# Patient Record
Sex: MALE | Race: WHITE | Employment: FULL TIME | URBAN - METROPOLITAN AREA
[De-identification: names, ages, dates, MRNs, and addresses within clinical notes are randomized per-mention and may not be internally consistent; named-entity substitution may affect disease eponyms.]

---

## 2024-08-11 ENCOUNTER — HOSPITAL ENCOUNTER (EMERGENCY)
Age: 39
Discharge: HOME | End: 2024-08-11
Attending: STUDENT IN AN ORGANIZED HEALTH CARE EDUCATION/TRAINING PROGRAM
Payer: COMMERCIAL

## 2024-08-11 ENCOUNTER — APPOINTMENT (OUTPATIENT)
Dept: GENERAL RADIOLOGY | Age: 39
End: 2024-08-11
Attending: STUDENT IN AN ORGANIZED HEALTH CARE EDUCATION/TRAINING PROGRAM
Payer: COMMERCIAL

## 2024-08-11 VITALS
DIASTOLIC BLOOD PRESSURE: 99 MMHG | TEMPERATURE: 97.9 F | SYSTOLIC BLOOD PRESSURE: 144 MMHG | OXYGEN SATURATION: 100 % | HEART RATE: 74 BPM | RESPIRATION RATE: 16 BRPM | WEIGHT: 196.21 LBS

## 2024-08-11 DIAGNOSIS — S62.320A CLOSED DISPLACED FRACTURE OF SHAFT OF SECOND METACARPAL BONE OF RIGHT HAND, INITIAL ENCOUNTER: Primary | ICD-10-CM

## 2024-08-11 DIAGNOSIS — S62.322A CLOSED DISPLACED FRACTURE OF SHAFT OF THIRD METACARPAL BONE OF RIGHT HAND, INITIAL ENCOUNTER: ICD-10-CM

## 2024-08-11 PROCEDURE — 99283 EMERGENCY DEPT VISIT LOW MDM: CPT

## 2024-08-11 PROCEDURE — 29125 APPL SHORT ARM SPLINT STATIC: CPT

## 2024-08-11 PROCEDURE — 73130 X-RAY EXAM OF HAND: CPT

## 2024-08-11 PROCEDURE — 6370000000 HC RX 637 (ALT 250 FOR IP): Performed by: STUDENT IN AN ORGANIZED HEALTH CARE EDUCATION/TRAINING PROGRAM

## 2024-08-11 RX ORDER — IBUPROFEN 800 MG/1
800 TABLET ORAL
Qty: 21 TABLET | Refills: 0 | Status: SHIPPED | OUTPATIENT
Start: 2024-08-11 | End: 2024-08-18

## 2024-08-11 RX ORDER — IBUPROFEN 800 MG/1
800 TABLET ORAL ONCE
Status: COMPLETED | OUTPATIENT
Start: 2024-08-11 | End: 2024-08-11

## 2024-08-11 RX ORDER — OXYCODONE HYDROCHLORIDE 5 MG/1
10 TABLET ORAL ONCE
Status: COMPLETED | OUTPATIENT
Start: 2024-08-11 | End: 2024-08-11

## 2024-08-11 RX ORDER — OXYCODONE HYDROCHLORIDE 5 MG/1
5 TABLET ORAL EVERY 6 HOURS PRN
Qty: 12 TABLET | Refills: 0 | Status: SHIPPED | OUTPATIENT
Start: 2024-08-11 | End: 2024-08-14

## 2024-08-11 RX ADMIN — OXYCODONE HYDROCHLORIDE 10 MG: 5 TABLET ORAL at 10:30

## 2024-08-11 RX ADMIN — IBUPROFEN 800 MG: 800 TABLET, FILM COATED ORAL at 10:30

## 2024-08-11 ASSESSMENT — PAIN SCALES - GENERAL: PAINLEVEL_OUTOF10: 4

## 2024-08-11 NOTE — ED PROVIDER NOTES
[facility]     EMERGENCY DEPARTMENT ENCOUNTER            Pt Name: Nash Madrigal   MRN: [id number]   Birthdate 1985   Date of evaluation: 8/11/2024   Provider: Marco Antonio Parks MD   PCP: Jayson Kelly MD   Note Started: 11:04 AM EDT 8/11/24          CHIEF COMPLAINT     Chief Complaint   Patient presents with    Hand Injury     Hit with a softball yesterday, R hand              HISTORY OF PRESENT ILLNESS:   History from : Patient   Limitations to history : None     Nash Madrigal is a 39 y.o. male who presents after being hit in the hand by a another player on a softball team.  Patient was running directly towards him.  He has had significant pain in his dominant right hand.  He has a previous fracture of the  index finger.  He has had surgery there.  Pain is directly below this and a significant swelling.  This happened yesterday.  Try to take ibuprofen and Tylenol for pain did not work.  Patient was worried when the swelling returned.  He has pain with any movement but he can move all fingers.  Has sensation.  Swelling is gotten worse since yesterday and now has a bruise on his palm.  No other injury    Nursing Notes were all reviewed and agreed with, or any disagreements were addressed in the HPI.     REVIEW OF SYSTEMS :    Positives and Pertinent negatives as per HPI.      MEDICAL HISTORY   has no past medical history on file.    History reviewed. No pertinent surgical history.   CURRENTMEDICATIONS       Discharge Medication List as of 8/11/2024 11:28 AM        CONTINUE these medications which have NOT CHANGED    Details   cephALEXin (KEFLEX) 500 MG capsule Take 1 capsule by mouth 4 times daily., Disp-40 capsule, R-0            SCREENINGS          Stephanie Coma Scale  Eye Opening: Spontaneous  Best Verbal Response: Oriented  Best Motor Response: Obeys commands  Stephanie Coma Scale Score: 15                CIWA Assessment  BP: (!) 144/99  Pulse: 74

## 2024-08-11 NOTE — ED NOTES
Patient DCed from ED at this time. Discussed AVS, follow up, and scripts. They verbalized understanding. Reinforced that should symptoms persist or worsen to return to the ED. They verbalized understanding. Patient ambulated out of ED. RN thanked patient for choosing [facility].

## 2024-08-14 ENCOUNTER — PREP FOR PROCEDURE (OUTPATIENT)
Dept: ORTHOPEDIC SURGERY | Age: 39
End: 2024-08-14

## 2024-08-14 ENCOUNTER — OFFICE VISIT (OUTPATIENT)
Dept: ORTHOPEDIC SURGERY | Age: 39
End: 2024-08-14

## 2024-08-14 ENCOUNTER — ANESTHESIA EVENT (OUTPATIENT)
Dept: OPERATING ROOM | Age: 39
End: 2024-08-14
Payer: COMMERCIAL

## 2024-08-14 VITALS — BODY MASS INDEX: 27.44 KG/M2 | WEIGHT: 196 LBS | HEIGHT: 71 IN | RESPIRATION RATE: 16 BRPM

## 2024-08-14 DIAGNOSIS — S62.322A CLOSED DISPLACED FRACTURE OF SHAFT OF THIRD METACARPAL BONE OF RIGHT HAND, INITIAL ENCOUNTER: ICD-10-CM

## 2024-08-14 DIAGNOSIS — S62.320A CLOSED DISPLACED FRACTURE OF SHAFT OF SECOND METACARPAL BONE OF RIGHT HAND, INITIAL ENCOUNTER: Primary | ICD-10-CM

## 2024-08-14 DIAGNOSIS — F17.200 CURRENT SMOKER: ICD-10-CM

## 2024-08-14 PROBLEM — S62.302A: Status: ACTIVE | Noted: 2024-08-14

## 2024-08-14 PROBLEM — S62.300A FRACTURE OF SECOND METACARPAL BONE OF RIGHT HAND: Status: ACTIVE | Noted: 2024-08-14

## 2024-08-14 RX ORDER — HYDROCODONE BITARTRATE AND ACETAMINOPHEN 5; 325 MG/1; MG/1
1 TABLET ORAL EVERY 6 HOURS PRN
Qty: 20 TABLET | Refills: 0 | Status: SHIPPED | OUTPATIENT
Start: 2024-08-14 | End: 2024-08-19

## 2024-08-14 SDOH — HEALTH STABILITY: PHYSICAL HEALTH: ON AVERAGE, HOW MANY MINUTES DO YOU ENGAGE IN EXERCISE AT THIS LEVEL?: 20 MIN

## 2024-08-14 SDOH — HEALTH STABILITY: PHYSICAL HEALTH: ON AVERAGE, HOW MANY DAYS PER WEEK DO YOU ENGAGE IN MODERATE TO STRENUOUS EXERCISE (LIKE A BRISK WALK)?: 4 DAYS

## 2024-08-14 NOTE — PROGRESS NOTES
CHIEF COMPLAINT: Right hand pain/ 2nd and 3rd  shaft fracture.    DATE OF INJURY: 8/10/2024    HISTORY:  Mr. Madrigal 39 y.o.  male right handed presents today for the first visit for evaluation of a right hand injury which occurred when he playing in a [employer] ball game and another player hit his hand. He is complaining of ulnar hand pain and swelling. Rates his pain a 4/10 VAS. This is better with elevation and worse with ROM. The pain is sharp and not radiating. No other complaint. He was seen at [facility], where he was evaluated and splinted and asked to see orthopedics. He is a smoker. He works a desk job.     No past medical history on file.    No past surgical history on file.    Social History     Socioeconomic History    Marital status: Single     Spouse name: Not on file    Number of children: Not on file    Years of education: Not on file    Highest education level: Not on file   Occupational History    Not on file   Tobacco Use    Smoking status: Some Days     Current packs/day: 1.00     Types: Cigarettes    Smokeless tobacco: Not on file   Substance and Sexual Activity    Alcohol use: No    Drug use: Never    Sexual activity: Not on file   Other Topics Concern    Not on file   Social History Narrative    Not on file     Social Determinants of Health     Financial Resource Strain: Not on file   Food Insecurity: Unknown (1/18/2024)    Received from [facility]     Food Insecurities     Worried about running out of food: Not on file     Food Bought: Not on file   Transportation Needs: Unknown (1/18/2024)    Received from [facility]     Transportation     Worried about transportation: Not on file   Physical Activity: Insufficiently Active (8/14/2024)    Exercise Vital Sign     Days of Exercise per Week: 4 days     Minutes of Exercise per Session: 20 min   Stress: Not on file   Social Connections: Not on file   Intimate Partner Violence: Unknown (1/18/2024)    Received from

## 2024-08-19 ENCOUNTER — ANESTHESIA (OUTPATIENT)
Dept: OPERATING ROOM | Age: 39
End: 2024-08-19
Payer: COMMERCIAL

## 2024-08-19 ENCOUNTER — APPOINTMENT (OUTPATIENT)
Dept: GENERAL RADIOLOGY | Age: 39
End: 2024-08-19
Attending: ORTHOPAEDIC SURGERY
Payer: COMMERCIAL

## 2024-08-19 ENCOUNTER — HOSPITAL ENCOUNTER (OUTPATIENT)
Age: 39
Setting detail: OUTPATIENT SURGERY
Discharge: HOME | End: 2024-08-19
Attending: ORTHOPAEDIC SURGERY | Admitting: ORTHOPAEDIC SURGERY
Payer: COMMERCIAL

## 2024-08-19 VITALS
BODY MASS INDEX: 26.5 KG/M2 | RESPIRATION RATE: 16 BRPM | WEIGHT: 195.66 LBS | HEART RATE: 83 BPM | TEMPERATURE: 97.4 F | SYSTOLIC BLOOD PRESSURE: 152 MMHG | OXYGEN SATURATION: 98 % | HEIGHT: 72 IN | DIASTOLIC BLOOD PRESSURE: 95 MMHG

## 2024-08-19 PROCEDURE — 73130 X-RAY EXAM OF HAND: CPT

## 2024-08-19 PROCEDURE — 2720000010 HC SURG SUPPLY STERILE: Performed by: ORTHOPAEDIC SURGERY

## 2024-08-19 PROCEDURE — 6370000000 HC RX 637 (ALT 250 FOR IP): Performed by: ANESTHESIOLOGY

## 2024-08-19 PROCEDURE — 6360000002 HC RX W HCPCS: Performed by: ORTHOPAEDIC SURGERY

## 2024-08-19 PROCEDURE — 2500000003 HC RX 250 WO HCPCS: Performed by: NURSE ANESTHETIST, CERTIFIED REGISTERED

## 2024-08-19 PROCEDURE — 3600000004 HC SURGERY LEVEL 4 BASE: Performed by: ORTHOPAEDIC SURGERY

## 2024-08-19 PROCEDURE — 3700000001 HC ADD 15 MINUTES (ANESTHESIA): Performed by: ORTHOPAEDIC SURGERY

## 2024-08-19 PROCEDURE — 7100000001 HC PACU RECOVERY - ADDTL 15 MIN: Performed by: ORTHOPAEDIC SURGERY

## 2024-08-19 PROCEDURE — 2580000003 HC RX 258: Performed by: STUDENT IN AN ORGANIZED HEALTH CARE EDUCATION/TRAINING PROGRAM

## 2024-08-19 PROCEDURE — 7100000011 HC PHASE II RECOVERY - ADDTL 15 MIN: Performed by: ORTHOPAEDIC SURGERY

## 2024-08-19 PROCEDURE — 3700000000 HC ANESTHESIA ATTENDED CARE: Performed by: ORTHOPAEDIC SURGERY

## 2024-08-19 PROCEDURE — A4217 STERILE WATER/SALINE, 500 ML: HCPCS | Performed by: ORTHOPAEDIC SURGERY

## 2024-08-19 PROCEDURE — 2580000003 HC RX 258: Performed by: ORTHOPAEDIC SURGERY

## 2024-08-19 PROCEDURE — 6360000002 HC RX W HCPCS: Performed by: NURSE ANESTHETIST, CERTIFIED REGISTERED

## 2024-08-19 PROCEDURE — C1713 ANCHOR/SCREW BN/BN,TIS/BN: HCPCS | Performed by: ORTHOPAEDIC SURGERY

## 2024-08-19 PROCEDURE — 3600000014 HC SURGERY LEVEL 4 ADDTL 15MIN: Performed by: ORTHOPAEDIC SURGERY

## 2024-08-19 PROCEDURE — 7100000010 HC PHASE II RECOVERY - FIRST 15 MIN: Performed by: ORTHOPAEDIC SURGERY

## 2024-08-19 PROCEDURE — 6360000002 HC RX W HCPCS: Performed by: ANESTHESIOLOGY

## 2024-08-19 PROCEDURE — 7100000000 HC PACU RECOVERY - FIRST 15 MIN: Performed by: ORTHOPAEDIC SURGERY

## 2024-08-19 PROCEDURE — 2709999900 HC NON-CHARGEABLE SUPPLY: Performed by: ORTHOPAEDIC SURGERY

## 2024-08-19 DEVICE — BONE SCREW, T5
Type: IMPLANTABLE DEVICE | Site: HAND | Status: FUNCTIONAL
Brand: VARIAX

## 2024-08-19 RX ORDER — NALOXONE HYDROCHLORIDE 0.4 MG/ML
INJECTION, SOLUTION INTRAMUSCULAR; INTRAVENOUS; SUBCUTANEOUS PRN
Status: DISCONTINUED | OUTPATIENT
Start: 2024-08-19 | End: 2024-08-19 | Stop reason: HOSPADM

## 2024-08-19 RX ORDER — SODIUM CHLORIDE 0.9 % (FLUSH) 0.9 %
5-40 SYRINGE (ML) INJECTION PRN
Status: DISCONTINUED | OUTPATIENT
Start: 2024-08-19 | End: 2024-08-19 | Stop reason: HOSPADM

## 2024-08-19 RX ORDER — ONDANSETRON 2 MG/ML
4 INJECTION INTRAMUSCULAR; INTRAVENOUS
Status: COMPLETED | OUTPATIENT
Start: 2024-08-19 | End: 2024-08-19

## 2024-08-19 RX ORDER — BUPIVACAINE HYDROCHLORIDE 5 MG/ML
INJECTION, SOLUTION EPIDURAL; INTRACAUDAL
Status: COMPLETED | OUTPATIENT
Start: 2024-08-19 | End: 2024-08-19

## 2024-08-19 RX ORDER — ONDANSETRON 2 MG/ML
INJECTION INTRAMUSCULAR; INTRAVENOUS PRN
Status: DISCONTINUED | OUTPATIENT
Start: 2024-08-19 | End: 2024-08-19 | Stop reason: SDUPTHER

## 2024-08-19 RX ORDER — SODIUM CHLORIDE 9 MG/ML
INJECTION, SOLUTION INTRAVENOUS PRN
Status: DISCONTINUED | OUTPATIENT
Start: 2024-08-19 | End: 2024-08-19 | Stop reason: HOSPADM

## 2024-08-19 RX ORDER — PROPOFOL 10 MG/ML
INJECTION, EMULSION INTRAVENOUS PRN
Status: DISCONTINUED | OUTPATIENT
Start: 2024-08-19 | End: 2024-08-19 | Stop reason: SDUPTHER

## 2024-08-19 RX ORDER — OXYCODONE HYDROCHLORIDE 5 MG/1
5 TABLET ORAL PRN
Status: COMPLETED | OUTPATIENT
Start: 2024-08-19 | End: 2024-08-19

## 2024-08-19 RX ORDER — MIDAZOLAM HYDROCHLORIDE 1 MG/ML
INJECTION INTRAMUSCULAR; INTRAVENOUS PRN
Status: DISCONTINUED | OUTPATIENT
Start: 2024-08-19 | End: 2024-08-19 | Stop reason: SDUPTHER

## 2024-08-19 RX ORDER — LIDOCAINE HYDROCHLORIDE 20 MG/ML
INJECTION, SOLUTION EPIDURAL; INFILTRATION; INTRACAUDAL; PERINEURAL PRN
Status: DISCONTINUED | OUTPATIENT
Start: 2024-08-19 | End: 2024-08-19 | Stop reason: SDUPTHER

## 2024-08-19 RX ORDER — HYDRALAZINE HYDROCHLORIDE 20 MG/ML
5 INJECTION INTRAMUSCULAR; INTRAVENOUS ONCE
Status: COMPLETED | OUTPATIENT
Start: 2024-08-19 | End: 2024-08-19

## 2024-08-19 RX ORDER — KETOROLAC TROMETHAMINE 30 MG/ML
INJECTION, SOLUTION INTRAMUSCULAR; INTRAVENOUS PRN
Status: DISCONTINUED | OUTPATIENT
Start: 2024-08-19 | End: 2024-08-19 | Stop reason: SDUPTHER

## 2024-08-19 RX ORDER — SODIUM CHLORIDE 0.9 % (FLUSH) 0.9 %
5-40 SYRINGE (ML) INJECTION EVERY 12 HOURS SCHEDULED
Status: DISCONTINUED | OUTPATIENT
Start: 2024-08-19 | End: 2024-08-19 | Stop reason: HOSPADM

## 2024-08-19 RX ORDER — OXYCODONE HYDROCHLORIDE 10 MG/1
10 TABLET ORAL PRN
Status: COMPLETED | OUTPATIENT
Start: 2024-08-19 | End: 2024-08-19

## 2024-08-19 RX ORDER — FENTANYL CITRATE 50 UG/ML
INJECTION, SOLUTION INTRAMUSCULAR; INTRAVENOUS PRN
Status: DISCONTINUED | OUTPATIENT
Start: 2024-08-19 | End: 2024-08-19 | Stop reason: SDUPTHER

## 2024-08-19 RX ORDER — DEXAMETHASONE SODIUM PHOSPHATE 4 MG/ML
INJECTION, SOLUTION INTRA-ARTICULAR; INTRALESIONAL; INTRAMUSCULAR; INTRAVENOUS; SOFT TISSUE PRN
Status: DISCONTINUED | OUTPATIENT
Start: 2024-08-19 | End: 2024-08-19 | Stop reason: SDUPTHER

## 2024-08-19 RX ORDER — MAGNESIUM HYDROXIDE 1200 MG/15ML
LIQUID ORAL CONTINUOUS PRN
Status: DISCONTINUED | OUTPATIENT
Start: 2024-08-19 | End: 2024-08-19 | Stop reason: HOSPADM

## 2024-08-19 RX ORDER — CEPHALEXIN 500 MG/1
500 CAPSULE ORAL 4 TIMES DAILY
Qty: 20 CAPSULE | Refills: 0 | Status: SHIPPED | OUTPATIENT
Start: 2024-08-19 | End: 2024-08-24

## 2024-08-19 RX ORDER — MEPERIDINE HYDROCHLORIDE 25 MG/ML
12.5 INJECTION INTRAMUSCULAR; INTRAVENOUS; SUBCUTANEOUS ONCE
Status: COMPLETED | OUTPATIENT
Start: 2024-08-19 | End: 2024-08-19

## 2024-08-19 RX ADMIN — PROPOFOL 200 MG: 10 INJECTION, EMULSION INTRAVENOUS at 08:37

## 2024-08-19 RX ADMIN — HYDROMORPHONE HYDROCHLORIDE 1 MG: 1 INJECTION, SOLUTION INTRAMUSCULAR; INTRAVENOUS; SUBCUTANEOUS at 08:55

## 2024-08-19 RX ADMIN — DEXAMETHASONE SODIUM PHOSPHATE 8 MG: 4 INJECTION, SOLUTION INTRAMUSCULAR; INTRAVENOUS at 08:44

## 2024-08-19 RX ADMIN — SODIUM CHLORIDE 2000 MG: 900 INJECTION INTRAVENOUS at 08:42

## 2024-08-19 RX ADMIN — HYDROMORPHONE HYDROCHLORIDE 0.25 MG: 1 INJECTION, SOLUTION INTRAMUSCULAR; INTRAVENOUS; SUBCUTANEOUS at 10:04

## 2024-08-19 RX ADMIN — MEPERIDINE HYDROCHLORIDE 12.5 MG: 25 INJECTION INTRAMUSCULAR; INTRAVENOUS; SUBCUTANEOUS at 10:15

## 2024-08-19 RX ADMIN — FENTANYL CITRATE 50 MCG: 50 INJECTION INTRAMUSCULAR; INTRAVENOUS at 09:31

## 2024-08-19 RX ADMIN — ONDANSETRON 4 MG: 2 INJECTION INTRAMUSCULAR; INTRAVENOUS at 08:44

## 2024-08-19 RX ADMIN — MIDAZOLAM 2 MG: 1 INJECTION INTRAMUSCULAR; INTRAVENOUS at 08:34

## 2024-08-19 RX ADMIN — HYDROMORPHONE HYDROCHLORIDE 0.25 MG: 1 INJECTION, SOLUTION INTRAMUSCULAR; INTRAVENOUS; SUBCUTANEOUS at 10:11

## 2024-08-19 RX ADMIN — HYDRALAZINE HYDROCHLORIDE 5 MG: 20 INJECTION, SOLUTION INTRAMUSCULAR; INTRAVENOUS at 10:14

## 2024-08-19 RX ADMIN — KETOROLAC TROMETHAMINE 30 MG: 30 INJECTION, SOLUTION INTRAMUSCULAR at 09:43

## 2024-08-19 RX ADMIN — HYDROMORPHONE HYDROCHLORIDE 0.5 MG: 1 INJECTION, SOLUTION INTRAMUSCULAR; INTRAVENOUS; SUBCUTANEOUS at 10:23

## 2024-08-19 RX ADMIN — LIDOCAINE HYDROCHLORIDE 100 MG: 20 INJECTION, SOLUTION EPIDURAL; INFILTRATION; INTRACAUDAL; PERINEURAL at 08:37

## 2024-08-19 RX ADMIN — SODIUM CHLORIDE: 9 INJECTION, SOLUTION INTRAVENOUS at 07:25

## 2024-08-19 RX ADMIN — OXYCODONE 5 MG: 5 TABLET ORAL at 11:10

## 2024-08-19 RX ADMIN — PROPOFOL 25 MG: 10 INJECTION, EMULSION INTRAVENOUS at 08:56

## 2024-08-19 RX ADMIN — FENTANYL CITRATE 50 MCG: 50 INJECTION INTRAMUSCULAR; INTRAVENOUS at 08:36

## 2024-08-19 RX ADMIN — SODIUM CHLORIDE: 9 INJECTION, SOLUTION INTRAVENOUS at 09:34

## 2024-08-19 RX ADMIN — ONDANSETRON 4 MG: 2 INJECTION INTRAMUSCULAR; INTRAVENOUS at 10:32

## 2024-08-19 ASSESSMENT — PAIN SCALES - GENERAL
PAINLEVEL_OUTOF10: 5
PAINLEVEL_OUTOF10: 4
PAINLEVEL_OUTOF10: 5
PAINLEVEL_OUTOF10: 4
PAINLEVEL_OUTOF10: 0
PAINLEVEL_OUTOF10: 6
PAINLEVEL_OUTOF10: 7
PAINLEVEL_OUTOF10: 0
PAINLEVEL_OUTOF10: 5
PAINLEVEL_OUTOF10: 3
PAINLEVEL_OUTOF10: 5

## 2024-08-19 ASSESSMENT — PAIN DESCRIPTION - PAIN TYPE
TYPE: SURGICAL PAIN

## 2024-08-19 ASSESSMENT — PAIN DESCRIPTION - DESCRIPTORS
DESCRIPTORS: ACHING
DESCRIPTORS: ACHING;DISCOMFORT
DESCRIPTORS: DISCOMFORT
DESCRIPTORS: ACHING
DESCRIPTORS: ACHING
DESCRIPTORS: DISCOMFORT
DESCRIPTORS: THROBBING
DESCRIPTORS: ACHING
DESCRIPTORS: DISCOMFORT

## 2024-08-19 ASSESSMENT — PAIN DESCRIPTION - FREQUENCY
FREQUENCY: CONTINUOUS

## 2024-08-19 ASSESSMENT — PAIN - FUNCTIONAL ASSESSMENT
PAIN_FUNCTIONAL_ASSESSMENT: PREVENTS OR INTERFERES SOME ACTIVE ACTIVITIES AND ADLS
PAIN_FUNCTIONAL_ASSESSMENT: 0-10
PAIN_FUNCTIONAL_ASSESSMENT: PREVENTS OR INTERFERES SOME ACTIVE ACTIVITIES AND ADLS
PAIN_FUNCTIONAL_ASSESSMENT: PREVENTS OR INTERFERES SOME ACTIVE ACTIVITIES AND ADLS
PAIN_FUNCTIONAL_ASSESSMENT: ACTIVITIES ARE NOT PREVENTED
PAIN_FUNCTIONAL_ASSESSMENT: PREVENTS OR INTERFERES SOME ACTIVE ACTIVITIES AND ADLS
PAIN_FUNCTIONAL_ASSESSMENT: 0-10

## 2024-08-19 ASSESSMENT — PAIN DESCRIPTION - LOCATION
LOCATION: HAND

## 2024-08-19 ASSESSMENT — ENCOUNTER SYMPTOMS: SHORTNESS OF BREATH: 0

## 2024-08-19 ASSESSMENT — PAIN DESCRIPTION - ORIENTATION
ORIENTATION: RIGHT

## 2024-08-19 ASSESSMENT — PAIN DESCRIPTION - ONSET
ONSET: ON-GOING
ONSET: ON-GOING
ONSET: AWAKENED FROM SLEEP
ONSET: ON-GOING

## 2024-08-19 NOTE — ANESTHESIA PRE PROCEDURE
Department of Anesthesiology  Preprocedure Note       Name:  Nash Madrigal   Age:  39 y.o.  :  1985                                          MRN:  [id number]         Date:  2024      Surgeon: Surgeon(s):  Deepa Marie MD    Procedure: Procedure(s):  RIGHT SECOND AND THIRD FINGER OPEN REDUCTION INTERNAL FIXATION    Medications prior to admission:   Prior to Admission medications    Medication Sig Start Date End Date Taking? Authorizing Provider   cephALEXin (KEFLEX) 500 MG capsule Take 1 capsule by mouth 4 times daily for 5 days 24 Yes Deepa Marie MD   HYDROcodone-acetaminophen (NORCO) 5-325 MG per tablet Take 1 tablet by mouth every 6 hours as needed for Pain for up to 5 days. Max Daily Amount: 4 tablets 24 Yes Deepa Marie MD   ibuprofen (ADVIL;MOTRIN) 800 MG tablet Take 1 tablet by mouth 3 times daily (with meals) for 7 days 24  Marco Antonio Parks MD   cephALEXin (KEFLEX) 500 MG capsule Take 1 capsule by mouth 4 times daily. 13   Benji Wyatt DO       Current medications:    Current Facility-Administered Medications   Medication Dose Route Frequency Provider Last Rate Last Admin   • sodium chloride flush 0.9 % injection 5-40 mL  5-40 mL IntraVENous 2 times per day Toñito Haynes MD       • sodium chloride flush 0.9 % injection 5-40 mL  5-40 mL IntraVENous PRN Toñito Haynes MD       • 0.9 % sodium chloride infusion   IntraVENous PRN Toñito Haynes  mL/hr at 24 0725 New Bag at 24 0725   • ceFAZolin (ANCEF) 2,000 mg in sodium chloride 0.9 % 50 mL IVPB (mini-bag)  2,000 mg IntraVENous On Call to OR Deepa Marie MD           Allergies:  No Known Allergies    Problem List:    Patient Active Problem List   Diagnosis Code   • Closed fracture of third metacarpal bone of right hand S62.302A   • Fracture of second metacarpal bone of right hand S62.300A       Past Medical History:        Diagnosis Date   • Wears contact lenses

## 2024-08-19 NOTE — H&P
Preoperative H&P Update    The patient's History and Physical in the medical record from 8/14/2024 was reviewed by me today.    Past Medical History:   Diagnosis Date    Wears contact lenses     Wears glasses      Past Surgical History:   Procedure Laterality Date    FINGER FRACTURE SURGERY Right 2013    TONSILLECTOMY      WISDOM TOOTH EXTRACTION       No current facility-administered medications on file prior to encounter.     Current Outpatient Medications on File Prior to Encounter   Medication Sig Dispense Refill    HYDROcodone-acetaminophen (NORCO) 5-325 MG per tablet Take 1 tablet by mouth every 6 hours as needed for Pain for up to 5 days. Max Daily Amount: 4 tablets 20 tablet 0    ibuprofen (ADVIL;MOTRIN) 800 MG tablet Take 1 tablet by mouth 3 times daily (with meals) for 7 days 21 tablet 0    cephALEXin (KEFLEX) 500 MG capsule Take 1 capsule by mouth 4 times daily. 40 capsule 0       No Known Allergies   I reviewed the HPI, medications, allergies, reason for surgery, diagnosis and treatment plan and there has been no change.    The patient was evaluated by me today. Physical exam findings for this update include:    Vitals:    08/19/24 0714   BP: 130/88   Pulse: 73   Resp: 16   Temp: 97.2 °F (36.2 °C)   SpO2: 98%     Airway is intact  Chest: chest clear, no wheezing, rales, normal symmetric air entry, no tachypnea, retractions or cyanosis  Heart: regular rate and rhythm ; heart sounds normal  Findings on exam of the body region where surgery is to be performed include:  Right hand pain/ 2nd and 3rd MC shaft fracture.     Electronically signed by Deepa Marie MD on 8/19/2024 at 7:41 AM

## 2024-08-19 NOTE — ANESTHESIA POSTPROCEDURE EVALUATION
Pulse:89, Resp:13, SpO2:93 %  08/19/24 1020, BP:(!) 158/107, Pulse:84, Resp:14, SpO2:93 %  08/19/24 1015, BP:(!) 154/107, Pulse:79, Resp:10, SpO2:91 %  08/19/24 1010, BP:(!) 160/99, Pulse:84, Resp:13, SpO2:91 %  08/19/24 1005, BP:(!) 160/107, Pulse:85, Resp:16, SpO2:92 %  08/19/24 1000, BP:(!) 167/110, Pulse:87, Resp:17, SpO2:94 %  08/19/24 0955, BP:(!) 161/107, Temp:96.8 °F (36 °C), Temp src:Temporal, Pulse:78, Resp:15, SpO2:94 %  08/19/24 0714, BP:130/88, Temp:97.2 °F (36.2 °C), Temp src:Temporal, Pulse:73, Resp:16, SpO2:98 %, Height:1.829 m (6'), Weight:88.7 kg (195 lb 10.5 oz)     LABS:    CBC  No results found for: \"WBC\", \"HGB\", \"HCT\", \"PLT\"  RENAL  No results found for: \"NA\", \"K\", \"CL\", \"CO2\", \"BUN\", \"CREATININE\", \"GLUCOSE\"  COAGS  No results found for: \"PROTIME\", \"INR\", \"APTT\"    Intake & Output:  @24HRIO@    Nausea & Vomiting:  No    Level of Consciousness:  Awake    Pain Assessment:  Adequate analgesia    Anesthesia Complications:  No apparent anesthetic complications    SUMMARY      Vital signs stable  OK to discharge from Stage I post anesthesia care.  Care transferred from Anesthesiology department on discharge from perioperative area   Pain management: satisfactory to patient    No notable events documented.

## 2024-08-19 NOTE — FLOWSHEET NOTE
Pt. Arrived in PACU, snoring.  O2 sat 94% on 2L.  /107.  GALINA Vargas notified Dr. Lam.  No new orders at this time.

## 2024-08-19 NOTE — DISCHARGE INSTRUCTIONS
Post op instruction:  1- D/C home  2- Dx Right hand pain/ 2nd and 3rd MC shaft fracture.   3- Non Weight Bearing right hand.  4- Elevation surgical site, with ice  5- Keep splint dry and clean  6- Follow Up in 2 weeks.  7- For DVT prophylaxis- Aspirin 325 mg daily     Deepa Marie MD, 8/19/2024

## 2024-08-19 NOTE — FLOWSHEET NOTE
/103.  Dr. Lam notified.  No new orders.  Pt. Sitting up in stretcher.  Rates his pain a 5.  No longer shivering. C/o mild nausea.

## 2024-08-20 NOTE — BRIEF OP NOTE
Brief Postoperative Note      Patient: Nash Madrigal  YOB: 1985  MRN: [id number]    Date of Procedure: 8/19/2024    Pre-Op Diagnosis Codes:      * Closed fracture of third metacarpal bone of right hand [S62.302A]     * Fracture of second metacarpal bone of right hand [S62.300A]    Post-Op Diagnosis: Same       Procedure(s):  RIGHT SECOND AND THIRD FINGER OPEN REDUCTION INTERNAL FIXATION    Surgeon(s):  Deepa Marie MD    Assistant:  Physician Assistant: Yasmine Cruz CNP    Anesthesia: General    Estimated Blood Loss (mL): Minimal    Complications: None    Specimens:   * No specimens in log *    Implants:  Implant Name Type Inv. Item Serial No.  Lot No. LRB No. Used Action   SCREW BNE L9MM DIA1.7MM T5 VARIAX - [id number]  SCREW BNE L9MM DIA1.7MM T5 VARIAX  [employer] [facility]  Right 2 Implanted   SCREW BONE T5 1.7MM L10MM - [id number]  SCREW BONE T5 1.7MM L10MM  [employer] [facility]  Right 4 Implanted   SCREW BNE L11MM DIA1.7MM T5 VARIAX - [id number]  SCREW BNE L11MM DIA1.7MM T5 VARIAX  [employer] [facility]  Right 1 Implanted         Drains: * No LDAs found *    Findings:  Infection Present At Time Of Surgery (PATOS) (choose all levels that have infection present):  No infection present  Other Findings: Same.    Electronically signed by Deepa Marie MD on 8/20/2024 at 7:11 AM

## 2024-08-20 NOTE — OP NOTE
[facility]          [address] [place] [address]                            OPERATIVE REPORT      PATIENT NAME: TARAH NUNEZ                : 1985  MED REC NO: [id number]                      ROOM: OR  ACCOUNT NO: [id number]                       ADMIT DATE: 2024  PROVIDER: Deepa Marie MD      DATE OF PROCEDURE:  2024    SURGEON:  Deepa Marie MD    ASSISTANT:  Yasmine Cruz CNP.    PREOPERATIVE DIAGNOSES:    1. Right hand 2nd metacarpal shaft spiral displaced fracture.  2. Right hand 3rd metacarpal shaft displaced fracture.    POSTOPERATIVE DIAGNOSES:    1. Right hand 2nd metacarpal shaft spiral displaced fracture.  2. Right hand 3rd metacarpal shaft displaced fracture.    PROCEDURE DONE:    1. Open treatment of right hand 2nd metacarpal fracture with open reduction and internal fixation.  2. Open treatment of right hand 3rd metacarpal fracture with open reduction and internal fixation.    ANESTHESIA:  General anesthesia.    ESTIMATED BLOOD LOSS:  Minimal.    COMPLICATIONS:  None.    TOURNIQUET:  Right upper arm 250 mmHg.    IMPLANT USED:  [employer] titanium 1.7 lag screw x5 for the 2nd and x3 for the 3rd metacarpal.    INDICATION:  This is a 39-year-old white male, right-hand dominant, who is still fairly active, sustained injury to his right hand when he was playing a [employer] ball.  He ran into another player who hit his hand with right hand injuries.  He was then found to have a displaced 2nd and 3rd metacarpal shaft fracture.  All risks, benefits, and alternatives discussed with the patient, he agreed to proceed with surgical fixation.    DESCRIPTION OF PROCEDURE:  The patient's right hand was marked.  He received 2 g Ancef IV preoperatively.  The patient was then brought to the operating room, underwent general anesthesia.  A well-padded tourniquet was placed to right upper arm.  The right upper extremity was then prepped

## 2024-09-04 ENCOUNTER — OFFICE VISIT (OUTPATIENT)
Dept: ORTHOPEDIC SURGERY | Age: 39
End: 2024-09-04

## 2024-09-04 VITALS — HEIGHT: 72 IN | BODY MASS INDEX: 26.49 KG/M2 | WEIGHT: 195.55 LBS

## 2024-09-04 DIAGNOSIS — S62.322A CLOSED DISPLACED FRACTURE OF SHAFT OF THIRD METACARPAL BONE OF RIGHT HAND, INITIAL ENCOUNTER: Primary | ICD-10-CM

## 2024-09-04 PROCEDURE — 99024 POSTOP FOLLOW-UP VISIT: CPT | Performed by: ORTHOPAEDIC SURGERY

## 2024-09-04 NOTE — PROGRESS NOTES
DIAGNOSIS:  Right 2nd and 3rd MC shaft fracture, status post ORIF, screws.    DATE OF SURGERY:  8/19/2024.    HISTORY OF PRESENT ILLNESS:  Mr. Madrigal 39 y.o.  male right handed returns today  for 2 weeks postoperative visit.  The patient denies any significant pain in the right hand.  No numbness or tingling sensation. No fever or Chills. He had an old injury to his right index finger with residual stiffness and deformity.    PHYSICAL EXAMINATION:  The incision is completely healed .  No signs of any erythema or drainage. He has no pain with the active or passive range of motion of the right hand, but decrease ROM.  He  has intact sensation, distally, and he  is neurovascularly intact.    IMAGING:  Three views right hand showed anatomic alignment of right 2nd and 3rd MC, screws in good position, no loosening.      IMPRESSION:  2 weeks out from right 2nd and 3rd MC ORIF and doing very well.    PLAN:  I have told the patient to work on ROM, as well as strengthening exercises. A removable wrist brace applied. No heavy impact activities. The patient will come back for a follow up in 6 weeks.  At that time, we will take 3 views of the right hand. OT if needed then.      Deepa Marie MD

## 2024-10-23 ENCOUNTER — OFFICE VISIT (OUTPATIENT)
Dept: ORTHOPEDIC SURGERY | Age: 39
End: 2024-10-23

## 2024-10-23 VITALS — WEIGHT: 195 LBS | BODY MASS INDEX: 26.41 KG/M2 | HEIGHT: 72 IN

## 2024-10-23 DIAGNOSIS — S62.322A CLOSED DISPLACED FRACTURE OF SHAFT OF THIRD METACARPAL BONE OF RIGHT HAND, INITIAL ENCOUNTER: Primary | ICD-10-CM

## 2024-10-23 PROCEDURE — 99024 POSTOP FOLLOW-UP VISIT: CPT | Performed by: NURSE PRACTITIONER

## 2024-10-24 NOTE — PROGRESS NOTES
DIAGNOSIS:  Right 2nd and 3rd MC shaft fracture, status post ORIF, screws.    DATE OF SURGERY:  8/19/2024.    HISTORY OF PRESENT ILLNESS:  Mr. Madrigal 39 y.o.  male right handed returns today  for 8 weeks postoperative visit.  The patient denies any significant pain in the right hand. Rates pain a 1/10 VAS mild, aching, intermittent and are improving. Aggravating factors movement. Alleviating factors rest. No numbness or tingling sensation. No fever or Chills. He had an old injury to his right index finger with residual stiffness and deformity.    PHYSICAL EXAMINATION:  The incision is completely healed .  No signs of any erythema or drainage. He has no pain with the active or passive range of motion of the right hand, but decrease ROM.  He  has intact sensation, distally, and he  is neurovascularly intact.    IMAGING:  Three views right hand showed anatomic alignment of right 2nd and 3rd MC, screws in good position, no loosening.      IMPRESSION:  8 weeks out from right 2nd and 3rd MC ORIF and doing very well.    PLAN:  I have told the patient to work on ROM, as well as strengthening exercises. He would like to do it on his own. He can discontinue the wrist brace. No heavy impact activities. The patient will come back for a follow up in 6 weeks.  At that time, we will take 3 views of the right hand. OT if needed then.      RYAN Barkley - CNP

## (undated) DEVICE — GOWN SIRUS NONREIN XL W/TWL: Brand: MEDLINE INDUSTRIES, INC.

## (undated) DEVICE — DRILL, WL 27MM, AO-SHAFT: Brand: PROFYLE

## (undated) DEVICE — SOLUTION IRRIG 1000ML 0.9% SOD CHL USP POUR PLAS BTL

## (undated) DEVICE — HAND AND ELBOW: Brand: MEDLINE INDUSTRIES, INC.

## (undated) DEVICE — STRIP,CLOSURE,WOUND,MEDI-STRIP,1/2X4: Brand: MEDLINE

## (undated) DEVICE — SUTURE VICRYL + SZ 3-0 L18IN ABSRB UD SH 1/2 CIR TAPERCUT NDL VCP864D

## (undated) DEVICE — GLOVE SURG SZ 65 THK91MIL LTX FREE SYN POLYISOPRENE

## (undated) DEVICE — GLOVE SURG SZ 65 L12IN FNGR THK79MIL GRN LTX FREE

## (undated) DEVICE — SUTURE MONOCRYL + SZ 4-0 L18IN ABSRB UD L19MM PS-2 3/8 CIR MCP496G

## (undated) DEVICE — PADDING,UNDERCAST,COTTON, 4"X4YD STERILE: Brand: MEDLINE

## (undated) DEVICE — C-ARM PACK: Brand: C-ARM COVER

## (undated) DEVICE — MERCY HEALTH WEST TURNOVER: Brand: MEDLINE INDUSTRIES, INC.

## (undated) DEVICE — DRESSING,GAUZE,XEROFORM,CURAD,1"X8",ST: Brand: CURAD

## (undated) DEVICE — KIRSCHNER WIRE

## (undated) DEVICE — BANDAGE COMPR W4INXL15FT BGE E SGL LAYERED CLP CLSR

## (undated) DEVICE — MASTISOL ADHESIVE LIQ 2/3ML

## (undated) DEVICE — ELECTRODE PT RET AD L9FT HI MOIST COND ADH HYDRGEL CORDED

## (undated) DEVICE — GLOVE SURG SZ 8 CRM LTX FREE POLYISOPRENE POLYMER BEAD ANTI

## (undated) DEVICE — BASIC SINGLE BASIN 1-LF: Brand: MEDLINE INDUSTRIES, INC.

## (undated) DEVICE — TRANSFER SET 3": Brand: MEDLINE INDUSTRIES, INC.

## (undated) DEVICE — SPLINT ORTH W3XL12IN LAYERED FBRGLS FOAM PD BRTH BK MOLD